# Patient Record
Sex: MALE | Race: WHITE | NOT HISPANIC OR LATINO | URBAN - METROPOLITAN AREA
[De-identification: names, ages, dates, MRNs, and addresses within clinical notes are randomized per-mention and may not be internally consistent; named-entity substitution may affect disease eponyms.]

---

## 2022-07-09 ENCOUNTER — TELEPHONE ENCOUNTER (OUTPATIENT)
Dept: URBAN - METROPOLITAN AREA CLINIC 121 | Facility: CLINIC | Age: 62
End: 2022-07-09

## 2022-07-09 RX ORDER — LOSARTAN POTASSIUM 50 MG/1
TABLET, FILM COATED ORAL
Refills: 0 | OUTPATIENT
Start: 2013-08-27 | End: 2019-01-28

## 2022-07-09 RX ORDER — SUCRALFATE 1 G/1
DISSOLVE 1 TAB IN 2-3 TBSP WATER AND DRINK - DO THIS QID, AC & HS X 1 MONTH TABLET ORAL
Refills: 0 | OUTPATIENT
Start: 2019-04-19 | End: 2019-07-15

## 2022-07-09 RX ORDER — LISINOPRIL 10 MG/1
TABLET ORAL ONCE A DAY
Refills: 0 | OUTPATIENT
Start: 2009-05-22 | End: 2013-08-27

## 2022-07-09 RX ORDER — LOSARTAN POTASSIUM 100 MG/1
TABLET, FILM COATED ORAL ONCE A DAY
Refills: 0 | OUTPATIENT
Start: 2019-01-28 | End: 2019-01-28

## 2022-07-09 RX ORDER — ATORVASTATIN CALCIUM 10 MG/1
TABLET, FILM COATED ORAL
Refills: 0 | OUTPATIENT
Start: 2013-08-27 | End: 2019-01-28

## 2022-07-10 ENCOUNTER — TELEPHONE ENCOUNTER (OUTPATIENT)
Dept: URBAN - METROPOLITAN AREA CLINIC 121 | Facility: CLINIC | Age: 62
End: 2022-07-10

## 2022-07-10 RX ORDER — SUCRALFATE 1 G/10ML
TAKE 10ML (2 TSP) QID - AC & HS X 1 MONTH SUSPENSION ORAL
Refills: 0 | Status: ACTIVE | COMMUNITY
Start: 2019-04-09

## 2022-07-10 RX ORDER — RABEPRAZOLE SODIUM 20 MG/1
TABLET, DELAYED RELEASE ORAL ONCE A DAY
Refills: 0 | Status: ACTIVE | COMMUNITY
Start: 2006-05-11

## 2022-07-10 RX ORDER — LOSARTAN POTASSIUM AND HYDROCHLOROTHIAZIDE 12.5; 1 MG/1; MG/1
TABLET, FILM COATED ORAL
Refills: 0 | Status: ACTIVE | COMMUNITY
Start: 2019-01-28

## 2022-07-10 RX ORDER — ATORVASTATIN CALCIUM 10 MG/1
TABLET, FILM COATED ORAL
Refills: 0 | Status: ACTIVE | COMMUNITY
Start: 2019-01-28

## 2022-07-10 RX ORDER — SUCRALFATE 1 G/1
DISSOLVE 1 TABLET IN 2 TO 3 TABLE SPOON OF WATER AND DRINK FOUR TIMES DAILY BEFORE MEALS AND EVERY NIGHT AT BEDTIME TABLET ORAL
Refills: 0 | Status: ACTIVE | COMMUNITY
Start: 2019-07-15

## 2022-07-10 RX ORDER — OMEPRAZOLE 40 MG/1
TAKE 1 TAB PO QAM 30-60 MINS BEFORE BREAKFAST CAPSULE, DELAYED RELEASE ORAL ONCE A DAY
Refills: 3 | Status: ACTIVE | COMMUNITY
Start: 2019-04-09

## 2023-10-16 ENCOUNTER — OFFICE VISIT (OUTPATIENT)
Dept: URBAN - METROPOLITAN AREA CLINIC 63 | Facility: CLINIC | Age: 63
End: 2023-10-16
Payer: COMMERCIAL

## 2023-10-16 VITALS
HEART RATE: 82 BPM | OXYGEN SATURATION: 98 % | WEIGHT: 215 LBS | DIASTOLIC BLOOD PRESSURE: 74 MMHG | SYSTOLIC BLOOD PRESSURE: 122 MMHG | TEMPERATURE: 98.2 F | BODY MASS INDEX: 30.78 KG/M2 | HEIGHT: 70 IN

## 2023-10-16 DIAGNOSIS — Z87.19 HISTORY OF ESOPHAGEAL ULCER: ICD-10-CM

## 2023-10-16 DIAGNOSIS — K21.9 GASTROESOPHAGEAL REFLUX DISEASE, UNSPECIFIED WHETHER ESOPHAGITIS PRESENT: ICD-10-CM

## 2023-10-16 DIAGNOSIS — K62.5 RECTAL BLEEDING: ICD-10-CM

## 2023-10-16 PROBLEM — 235595009: Status: ACTIVE | Noted: 2023-10-16

## 2023-10-16 PROCEDURE — 99204 OFFICE O/P NEW MOD 45 MIN: CPT | Performed by: NURSE PRACTITIONER

## 2023-10-16 RX ORDER — ATORVASTATIN CALCIUM 10 MG/1
TABLET, FILM COATED ORAL
Refills: 0 | Status: ACTIVE | COMMUNITY
Start: 2019-01-28

## 2023-10-16 RX ORDER — LOSARTAN POTASSIUM AND HYDROCHLOROTHIAZIDE 12.5; 1 MG/1; MG/1
TABLET, FILM COATED ORAL
Refills: 0 | Status: ACTIVE | COMMUNITY
Start: 2019-01-28

## 2023-10-16 NOTE — PHYSICAL EXAM GASTROINTESTINAL
soft, nontender, nondistended , no masses palpable , normal bowel sounds , Liver and Spleen normal in size. Rectal exam: No pain with exam, no evidence of fissure. Internal hemorrhoids palpated. Brown stool, heme negative.

## 2023-10-16 NOTE — HPI-PREVIOUS PROCEDURES
When last seen in 2019 we reviewed his EGD/colonoscopy. EGD revealed a cratered ulcer at 8 mm in size at the GE junction with biopsies reporting findings consistent with ulcer with inflammatory exudate some reactive changes. A 2 cm hiatal hernia was noted. Diffuse mild inflammation was found in the gastric antrum and body with biopsies reporting reactive gastropathy and intestinal metaplasia but no evidence of H. pylori. The duodenum appeared normal.  The colonoscopy findings included a 7 mm hyperplastic polyp removed from the sigmoid colon, left-sided diverticulosis and small internal hemorrhoids. A prominent fold was noted in the rectum with biopsies reporting no pathologic change.  When last seen he was treated with Omeprazole and sucralfate. He was advised to return in 8 weeks for a repeat EGD. He subsequently canceled his appointment and we have not seen him since.

## 2023-10-16 NOTE — HPI-TODAY'S VISIT:
This is a 63-year-old male patient with a history of esophageal ulcer who presents to the office for evaluation of acid reflux, heartburn and rectal bleeding. He was last seen in April 2019.  Today he reports a long problem with heartburn and reflux and this has been worsening over the past couple of years. Now he is experiencing it 4-5 times a week. He is also awakening with acid "coming up into his mouth". He is aware that he eats the wrong things and does sometimes eat late at night. He denies NSAID use. He is taking OTC Prilosec 20mg intermittently. He denies dysphagia, nausea, appetite change or weight loss. No abdominal pain.  His other concern today is that of rectal bleeding. He has been seeing mucus with blood in it and is very concerned. He denies diarrhea or constipation.

## 2023-10-23 ENCOUNTER — LAB OUTSIDE AN ENCOUNTER (OUTPATIENT)
Dept: URBAN - METROPOLITAN AREA CLINIC 63 | Facility: CLINIC | Age: 63
End: 2023-10-23

## 2023-11-01 ENCOUNTER — OFFICE VISIT (OUTPATIENT)
Dept: URBAN - METROPOLITAN AREA SURGERY CENTER 4 | Facility: SURGERY CENTER | Age: 63
End: 2023-11-01
Payer: COMMERCIAL

## 2023-11-01 ENCOUNTER — CLAIMS CREATED FROM THE CLAIM WINDOW (OUTPATIENT)
Dept: URBAN - METROPOLITAN AREA CLINIC 4 | Facility: CLINIC | Age: 63
End: 2023-11-01
Payer: COMMERCIAL

## 2023-11-01 DIAGNOSIS — K25.7 CHRONIC GASTRIC ULCER WITHOUT HEMORRHAGE OR PERFORATION: ICD-10-CM

## 2023-11-01 DIAGNOSIS — K57.30 DIVERTICULOSIS OF LARGE INTESTINE WITHOUT PERFORATION OR ABSCESS WITHOUT BLEEDING: ICD-10-CM

## 2023-11-01 DIAGNOSIS — K62.5 HEMORRHAGE OF ANUS AND RECTUM: ICD-10-CM

## 2023-11-01 DIAGNOSIS — K52.9 NONINFECTIOUS GASTROENTERITIS, UNSPECIFIED TYPE: ICD-10-CM

## 2023-11-01 DIAGNOSIS — K30 FUNCTIONAL DYSPEPSIA: ICD-10-CM

## 2023-11-01 DIAGNOSIS — K20.90 ESOPHAGITIS, UNSPECIFIED WITHOUT BLEEDING: ICD-10-CM

## 2023-11-01 DIAGNOSIS — K29.70 GASTRITIS WITHOUT BLEEDING, UNSPECIFIED CHRONICITY, UNSPECIFIED GASTRITIS TYPE: ICD-10-CM

## 2023-11-01 DIAGNOSIS — R12 HEARTBURN: ICD-10-CM

## 2023-11-01 DIAGNOSIS — K44.9 HIATAL HERNIA: ICD-10-CM

## 2023-11-01 DIAGNOSIS — K25.9 GASTRIC ULCER WITHOUT HEMORRHAGE OR PERFORATION, UNSPECIFIED CHRONICITY: ICD-10-CM

## 2023-11-01 DIAGNOSIS — K64.8 OTHER HEMORRHOIDS: ICD-10-CM

## 2023-11-01 DIAGNOSIS — K44.9 DIAPHRAGMATIC HERNIA WITHOUT OBSTRUCTION OR GANGRENE: ICD-10-CM

## 2023-11-01 DIAGNOSIS — K62.5 RECTAL BLEEDING: ICD-10-CM

## 2023-11-01 PROCEDURE — 43239 EGD BIOPSY SINGLE/MULTIPLE: CPT | Performed by: INTERNAL MEDICINE

## 2023-11-01 PROCEDURE — 00813 ANES UPR LWR GI NDSC PX: CPT | Performed by: NURSE ANESTHETIST, CERTIFIED REGISTERED

## 2023-11-01 PROCEDURE — 88305 TISSUE EXAM BY PATHOLOGIST: CPT | Performed by: PATHOLOGY

## 2023-11-01 PROCEDURE — 88342 IMHCHEM/IMCYTCHM 1ST ANTB: CPT | Performed by: PATHOLOGY

## 2023-11-01 PROCEDURE — 45380 COLONOSCOPY AND BIOPSY: CPT | Performed by: INTERNAL MEDICINE

## 2023-11-01 PROCEDURE — 88341 IMHCHEM/IMCYTCHM EA ADD ANTB: CPT | Performed by: PATHOLOGY

## 2023-11-01 RX ORDER — LOSARTAN POTASSIUM AND HYDROCHLOROTHIAZIDE 12.5; 1 MG/1; MG/1
TABLET, FILM COATED ORAL
Refills: 0 | Status: ACTIVE | COMMUNITY
Start: 2019-01-28

## 2023-11-01 RX ORDER — ATORVASTATIN CALCIUM 10 MG/1
TABLET, FILM COATED ORAL
Refills: 0 | Status: ACTIVE | COMMUNITY
Start: 2019-01-28

## 2023-11-16 ENCOUNTER — APPOINTMENT (RX ONLY)
Dept: URBAN - METROPOLITAN AREA CLINIC 147 | Facility: CLINIC | Age: 63
Setting detail: DERMATOLOGY
End: 2023-11-16

## 2023-11-16 DIAGNOSIS — L91.8 OTHER HYPERTROPHIC DISORDERS OF THE SKIN: ICD-10-CM

## 2023-11-16 PROCEDURE — ? DEFER

## 2023-11-16 PROCEDURE — ? COUNSELING

## 2023-11-16 PROCEDURE — 99202 OFFICE O/P NEW SF 15 MIN: CPT

## 2023-11-16 ASSESSMENT — LOCATION SIMPLE DESCRIPTION DERM
LOCATION SIMPLE: RIGHT SUPERIOR EYELID
LOCATION SIMPLE: LEFT SUPERIOR EYELID

## 2023-11-16 ASSESSMENT — LOCATION DETAILED DESCRIPTION DERM
LOCATION DETAILED: RIGHT MEDIAL SUPERIOR EYELID
LOCATION DETAILED: LEFT MEDIAL SUPERIOR EYELID

## 2023-11-16 ASSESSMENT — LOCATION ZONE DERM: LOCATION ZONE: EYELID

## 2023-11-16 NOTE — PROCEDURE: DEFER
Size Of Lesion In Cm (Optional): 0
Detail Level: Detailed
Instructions (Optional): Referred to Dr. Valdes
Introduction Text (Please End With A Colon): The following procedure was deferred:

## 2023-11-28 ENCOUNTER — TELEPHONE ENCOUNTER (OUTPATIENT)
Dept: URBAN - METROPOLITAN AREA CLINIC 63 | Facility: CLINIC | Age: 63
End: 2023-11-28

## 2023-11-29 ENCOUNTER — DASHBOARD ENCOUNTERS (OUTPATIENT)
Age: 63
End: 2023-11-29

## 2023-11-30 ENCOUNTER — OFFICE VISIT (OUTPATIENT)
Dept: URBAN - METROPOLITAN AREA TELEHEALTH 1 | Facility: TELEHEALTH | Age: 63
End: 2023-11-30
Payer: COMMERCIAL

## 2023-11-30 ENCOUNTER — TELEPHONE ENCOUNTER (OUTPATIENT)
Dept: URBAN - METROPOLITAN AREA CLINIC 63 | Facility: CLINIC | Age: 63
End: 2023-11-30

## 2023-11-30 DIAGNOSIS — K20.90 ESOPHAGITIS: ICD-10-CM

## 2023-11-30 DIAGNOSIS — K29.50 OTHER CHRONIC GASTRITIS WITHOUT HEMORRHAGE: ICD-10-CM

## 2023-11-30 DIAGNOSIS — K25.7 CHRONIC GASTRIC ULCER WITHOUT HEMORRHAGE AND WITHOUT PERFORATION: ICD-10-CM

## 2023-11-30 DIAGNOSIS — K21.9 CHRONIC GERD: ICD-10-CM

## 2023-11-30 PROBLEM — 235595009: Status: ACTIVE | Noted: 2023-11-30

## 2023-11-30 PROBLEM — 8493009: Status: ACTIVE | Noted: 2023-11-30

## 2023-11-30 PROBLEM — 307496006: Status: ACTIVE | Noted: 2023-11-30

## 2023-11-30 PROBLEM — 76796008: Status: ACTIVE | Noted: 2023-11-30

## 2023-11-30 PROCEDURE — 99214 OFFICE O/P EST MOD 30 MIN: CPT | Performed by: NURSE PRACTITIONER

## 2023-11-30 RX ORDER — SUCRALFATE 1 G/1
1 TABLET ON AN EMPTY STOMACH BEFORE MEALS AND AT BEDTIME TABLET ORAL
Qty: 360 TABLET | Refills: 0 | OUTPATIENT
Start: 2023-11-30 | End: 2024-02-28

## 2023-11-30 RX ORDER — ATORVASTATIN CALCIUM 10 MG/1
TABLET, FILM COATED ORAL
Refills: 0 | Status: ACTIVE | COMMUNITY
Start: 2019-01-28

## 2023-11-30 RX ORDER — METRONIDAZOLE 500 MG/1
1 TABLET WITH FOOD TABLET ORAL THREE TIMES A DAY
Qty: 30 TABLET | Refills: 0 | OUTPATIENT
Start: 2023-11-30 | End: 2023-12-10

## 2023-11-30 RX ORDER — CIPROFLOXACIN HYDROCHLORIDE 500 MG/1
1 TABLET WITH FOOD TABLET, FILM COATED ORAL
Qty: 20 TABLET | Refills: 0 | OUTPATIENT
Start: 2023-11-30 | End: 2023-12-10

## 2023-11-30 RX ORDER — LOSARTAN POTASSIUM AND HYDROCHLOROTHIAZIDE 12.5; 1 MG/1; MG/1
TABLET, FILM COATED ORAL
Refills: 0 | Status: ACTIVE | COMMUNITY
Start: 2019-01-28

## 2023-11-30 NOTE — HPI-PREVIOUS PROCEDURES
EGD revealed visualization of non-severe esophagitis with biopsies reporting squamocolumnar mucosa with focal intestinal metaplasia arising in the background of reflux type esophagitis, consistent with Daugherty's esophagus if confirmed endoscopically. A 2 cm hiatal hernia was noted. Diffuse mild inflammation was found in the gastric body and gastric antrum. A nonbleeding cratered gastric ulcer was found measuring 10 mm in the gastric antrum. Biopsies reported chronic ulcer with associated foveolar hyperplasia, intestinal metaplasia and granulation tissue.  Colonoscopy revealed normal-appearing mucosa in the cecum, ascending, transverse, descending colon and rectum. Biopsies in these areas reported no significant abnormality. Localized mild inflammation characterized by congestion, erosions, erythema and friability and granularity was found in the rectosigmoid colon and in the sigmoid colon with biopsies reporting diverticular disease associated colitis was found on biopsy in the sigmoid colon. Biopsies in the rectum reported no significant abnormality.

## 2023-11-30 NOTE — HPI-TODAY'S VISIT:
This is a 63-year-old male patient with recent complaints of frequent heartburn and reflux, prior history of an esophageal ulcer, complaints of rectal bleeding who presents for follow-up after recent EGD/Colonoscopy. He was last seen on 10/16/2023.  A few diverticula were found in the sigmoid colon and medium sized internal hemorrhoids noted.  Today he reports doing well. He has doubled up on his Prilosec as directed an is now taking 40mg QAM. He has had less heartburn. He is still seeing small amounts of rectal bleeding each morning, but no pain or diarrhea. No fever or nausea.  When last seen he reported a long problem with heartburn and reflux which had been worsening over the prior couple years. In the office he reported experiencing heartburn 4-5 times a week, awakening with acid coming up into his mouth. He admitted to consumption of several gastrotoxin's and sometimes eating late at night. His other concern was rectal bleeding. He had been seeing mucus with blood and was very concerned. His last EGD was in 2019, performed with his colonoscopy. This had revealed a gastric ulcer at that time and intestinal metaplasia in the antrum without h pylori.

## 2023-12-04 ENCOUNTER — P2P PATIENT RECORD (OUTPATIENT)
Age: 63
End: 2023-12-04

## 2023-12-07 ENCOUNTER — LAB OUTSIDE AN ENCOUNTER (OUTPATIENT)
Dept: URBAN - METROPOLITAN AREA TELEHEALTH 1 | Facility: TELEHEALTH | Age: 63
End: 2023-12-07

## 2024-01-31 ENCOUNTER — OFFICE VISIT (OUTPATIENT)
Dept: URBAN - METROPOLITAN AREA SURGERY CENTER 4 | Facility: SURGERY CENTER | Age: 64
End: 2024-01-31

## 2024-02-13 ENCOUNTER — OV EP (OUTPATIENT)
Dept: URBAN - METROPOLITAN AREA CLINIC 63 | Facility: CLINIC | Age: 64
End: 2024-02-13